# Patient Record
Sex: MALE | Race: WHITE | Employment: FULL TIME | URBAN - METROPOLITAN AREA
[De-identification: names, ages, dates, MRNs, and addresses within clinical notes are randomized per-mention and may not be internally consistent; named-entity substitution may affect disease eponyms.]

---

## 2017-06-11 ENCOUNTER — HOSPITAL ENCOUNTER (EMERGENCY)
Facility: CLINIC | Age: 52
Discharge: HOME | End: 2017-06-11
Attending: NURSE PRACTITIONER | Admitting: NURSE PRACTITIONER
Payer: OTHER MISCELLANEOUS

## 2017-06-11 ENCOUNTER — APPOINTMENT (OUTPATIENT)
Dept: ULTRASOUND IMAGING | Facility: CLINIC | Age: 52
End: 2017-06-11
Attending: NURSE PRACTITIONER
Payer: OTHER MISCELLANEOUS

## 2017-06-11 VITALS
DIASTOLIC BLOOD PRESSURE: 81 MMHG | WEIGHT: 200 LBS | TEMPERATURE: 98.3 F | RESPIRATION RATE: 18 BRPM | SYSTOLIC BLOOD PRESSURE: 137 MMHG | OXYGEN SATURATION: 98 % | HEART RATE: 62 BPM

## 2017-06-11 DIAGNOSIS — M79.621 PAIN OF RIGHT UPPER ARM: ICD-10-CM

## 2017-06-11 DIAGNOSIS — M79.89 SWELLING OF RIGHT UPPER EXTREMITY: ICD-10-CM

## 2017-06-11 DIAGNOSIS — S49.91XA INJURY OF RIGHT UPPER ARM, INITIAL ENCOUNTER: ICD-10-CM

## 2017-06-11 PROCEDURE — 76882 US LMTD JT/FCL EVL NVASC XTR: CPT | Mod: RT

## 2017-06-11 PROCEDURE — 99284 EMERGENCY DEPT VISIT MOD MDM: CPT | Mod: 25

## 2017-06-11 ASSESSMENT — ENCOUNTER SYMPTOMS: SHORTNESS OF BREATH: 0

## 2017-06-11 NOTE — ED AVS SNAPSHOT
[facility] Emergency Department    [address] [place] [address]    Phone:  [phone]    Fax:  [phone]                                       Jacques Segura   MRN: [id number]    Department:  [facility] Emergency Department   Date of Visit:  6/11/2017           Patient Information     Date Of Birth          1965        Your diagnoses for this visit were:     Injury of right upper arm, initial encounter     Pain of right upper arm     Swelling of right upper extremity        You were seen by Zoraida Rodriguez, SMITA CNP.      Follow-up Information     Please follow up.    Why:  f/u with [facility]        Discharge Instructions         Follow up with [phone]    Ace Wrap  Minor muscle or joint injuries are often treated with an elastic bandage. The bandage provides support and compression to the injured area. An elastic bandage is a stretchy, rolled bandage. Elastic bandages range in width from 2 to 6 inches. They can be used for a variety of injuries. The bandages are often called  ACE  bandages, after the most common brand name.  If used correctly, elastic bandages help control swelling and ease pain. An elastic bandage is also a good reminder not to overuse the injured area. However, elastic bandages do not provide a lot of support and will not prevent reinjury.  Home care  To apply an elastic bandage:    Check the skin before wrapping the injury. It should be clean, dry, and free of drainage.    Start wrapping below the injury and work your way toward the body. For an ankle sprain, start wrapping around the foot and work up toward the calf. This will help control swelling.    Overlap the edges of the bandage so it stays snuggly in place.    Wrap the bandage firmly, but not too tightly. A tight bandage can increase swelling on either end of the bandage. Make sure the bandage is wrinkle free.    Leave fingers and toes exposed.    Secure ends of the bandage (even  self-sticking ones) with clips or tape.    Check frequently to ensure adequate circulation, especially in the fingers and toes. Loosen the bandage if there is local swelling, numbness, tingling, discomfort, coldness, or discoloration (skin pale or bluish in color).    Rewrap the bandage as needed during the day. Reroll the bandage as you unwind it.  Continue using the elastic bandage until the pain and swelling are gone or as your healthcare provider advises.  If you have been told to ice the area, the ice can be secured in place with the elastic bandage. Wrap the ice pack with a thin towel to protect the skin. Do not put ice or an ice pack directly on the skin.  Ice the area for no more than 20 minutes at a time.    Follow-up care  Follow up with your healthcare provider, as advised.  When to seek medical advice  Call your healthcare provider for any of the following:    Pain and swelling that doesn't get better or gets worse    Trouble moving injured area    Skin discoloration, numbness, or tingling that doesn t go away after bandage is removed    3543-0774 [employer]. [address] [place] [address]. All rights reserved. This information is not intended as a substitute for professional medical care. Always follow your healthcare professional's instructions.          Discharge References/Attachments     R.I.C.E. (ENGLISH)      24 Hour Appointment Hotline       To make an appointment at any [facility], call [phone] ([phone]). If you don't have a family doctor or clinic, we will help you find one. [facility] are conveniently located to serve the needs of you and your family.             Review of your medicines      Notice     You have not been prescribed any medications.            Procedures and tests performed during your visit     US Extremity Non Vascular Right      Orders Needing Specimen Collection     None      Pending Results     No orders found from  6/9/2017 to 6/12/2017.            Pending Culture Results     No orders found from 6/9/2017 to 6/12/2017.            Pending Results Instructions     If you had any lab results that were not finalized at the time of your Discharge, you can call the ED Lab Result RN at [phone]. You will be contacted by this team for any positive Lab results or changes in treatment. The nurses are available 7 days a week from 10A to 6:30P.  You can leave a message 24 hours per day and they will return your call.        Test Results From Your Hospital Stay              6/11/2017  8:44 PM      Narrative     US EXTREMITY NON VASCULAR RIGHT, 6/11/2017 8:34 PM    Comparison: None    History: R/O RT DISTAL BICEP TENDON RUPTURE        Impression     Impression: No biceps tendon rupture or fluid collection identified on  these images.    GUNNAR FINE                Clinical Quality Measure: Blood Pressure Screening     Your blood pressure was checked while you were in the emergency department today. The last reading we obtained was  BP: (!) 141/95 . Please read the guidelines below about what these numbers mean and what you should do about them.  If your systolic blood pressure (the top number) is less than 120 and your diastolic blood pressure (the bottom number) is less than 80, then your blood pressure is normal. There is nothing more that you need to do about it.  If your systolic blood pressure (the top number) is 120-139 or your diastolic blood pressure (the bottom number) is 80-89, your blood pressure may be higher than it should be. You should have your blood pressure rechecked within a year by a primary care provider.  If your systolic blood pressure (the top number) is 140 or greater or your diastolic blood pressure (the bottom number) is 90 or greater, you may have high blood pressure. High blood pressure is treatable, but if left untreated over time it can put you at risk for heart attack, stroke, or kidney failure. You  "should have your blood pressure rechecked by a primary care provider within the next 4 weeks.  If your provider in the emergency department today gave you specific instructions to follow-up with your doctor or provider even sooner than that, you should follow that instruction and not wait for up to 4 weeks for your follow-up visit.        Thank you for choosing [facility]       Thank you for choosing [facility] for your care. Our goal is always to provide you with excellent care. Hearing back from our patients is one way we can continue to improve our services. Please take a few minutes to complete the written survey that you may receive in the mail after you visit with us. Thank you!        [email or web address] Information     [email or web address] lets you send messages to your doctor, view your test results, renew your prescriptions, schedule appointments and more. To sign up, go to [email or web address] . Click on \"Log in\" on the left side of the screen, which will take you to the Welcome page. Then click on \"Sign up Now\" on the right side of the page.     You will be asked to enter the access code listed below, as well as some personal information. Please follow the directions to create your username and password.     Your access code is: [email or web address]  Expires: 2017  8:50 PM     Your access code will  in 90 days. If you need help or a new code, please call your [facility] clinic or [phone].        Care EveryWhere ID     This is your Care EveryWhere ID. This could be used by other organizations to access your [facility] medical records  [id number]        After Visit Summary       This is your record. Keep this with you and show to your community pharmacist(s) and doctor(s) at your next visit.                  "

## 2017-06-11 NOTE — ED AVS SNAPSHOT
[facility] Emergency Department    [address]    [facility] [address]    Phone:  [phone]    Fax:  [phone]                                       Jacques Segura   MRN: [id number]    Department:  [facility] Emergency Department   Date of Visit:  6/11/2017           After Visit Summary Signature Page     I have received my discharge instructions, and my questions have been answered. I have discussed any challenges I see with this plan with the nurse or doctor.    ..........................................................................................................................................  Patient/Patient Representative Signature      ..........................................................................................................................................  Patient Representative Print Name and Relationship to Patient    ..................................................               ................................................  Date                                            Time    ..........................................................................................................................................  Reviewed by Signature/Title    ...................................................              ..............................................  Date                                                            Time

## 2017-06-12 NOTE — DISCHARGE INSTRUCTIONS
Follow up with [facility] [phone]    Ace Wrap  Minor muscle or joint injuries are often treated with an elastic bandage. The bandage provides support and compression to the injured area. An elastic bandage is a stretchy, rolled bandage. Elastic bandages range in width from 2 to 6 inches. They can be used for a variety of injuries. The bandages are often called  ACE  bandages, after the most common brand name.  If used correctly, elastic bandages help control swelling and ease pain. An elastic bandage is also a good reminder not to overuse the injured area. However, elastic bandages do not provide a lot of support and will not prevent reinjury.  Home care  To apply an elastic bandage:    Check the skin before wrapping the injury. It should be clean, dry, and free of drainage.    Start wrapping below the injury and work your way toward the body. For an ankle sprain, start wrapping around the foot and work up toward the calf. This will help control swelling.    Overlap the edges of the bandage so it stays snuggly in place.    Wrap the bandage firmly, but not too tightly. A tight bandage can increase swelling on either end of the bandage. Make sure the bandage is wrinkle free.    Leave fingers and toes exposed.    Secure ends of the bandage (even self-sticking ones) with clips or tape.    Check frequently to ensure adequate circulation, especially in the fingers and toes. Loosen the bandage if there is local swelling, numbness, tingling, discomfort, coldness, or discoloration (skin pale or bluish in color).    Rewrap the bandage as needed during the day. Reroll the bandage as you unwind it.  Continue using the elastic bandage until the pain and swelling are gone or as your healthcare provider advises.  If you have been told to ice the area, the ice can be secured in place with the elastic bandage. Wrap the ice pack with a thin towel to protect the skin. Do not put ice or an ice pack directly on the skin.  Ice the area  for no more than 20 minutes at a time.    Follow-up care  Follow up with your healthcare provider, as advised.  When to seek medical advice  Call your healthcare provider for any of the following:    Pain and swelling that doesn't get better or gets worse    Trouble moving injured area    Skin discoloration, numbness, or tingling that doesn t go away after bandage is removed    7061-9405 [employer]. [address] [place] [address], [place] [id number]. All rights reserved. This information is not intended as a substitute for professional medical care. Always follow your healthcare professional's instructions.

## 2017-06-12 NOTE — ED PROVIDER NOTES
"  History     Chief Complaint:  Arm pain    HPI   Jacques Segura is a 51 year old male who presents with arm pain. Prior to arrival, the patient was lifting up on a valve when he heard a \"pop\" in his right arm and experienced a shooting pain that radiated down his right arm. The patient states he is still experiencing pain in his right arm and his right upper arm is deformed compared to his left arm. No chest pain or shortness of breath.    Allergies:  No known drug allergies.     Medications:    The patient is currently on no regular medications.     Past Medical History:    History reviewed.  No significant past medical history.     Past Surgical History:    History reviewed. No pertinent past surgical history.    Family History:    History reviewed. No pertinent family history.    Social History:  Marital Status:   Presents to the ED alone  Tobacco Use: negative  Alcohol Use: negative     Review of Systems   Respiratory: Negative for shortness of breath.    Cardiovascular: Negative for chest pain.   Musculoskeletal:        Positive for right arm pain   All other systems reviewed and are negative.    Physical Exam   First Vitals:  BP: (!) 141/95  Pulse: 67  Temp: 98.3  F (36.8  C)  Resp: 18  Weight: 90.7 kg (200 lb)  SpO2: 97 %      Physical Exam  Eyes: Pupils equally round  HENT: Head is normal in appearance. Oropharynx is normal with moist mucus membranes.  Cardiovascular: Normal color of mucus membranes  Respiratory: Normal respiratory effort  Musculoskeletal: Right upper arm mild soft tissue swelling distal bicep with a bulge, AC pulse intact, normal ROM to elbow, shoulder and wrist.   Skin: Normal, without rash.  Lymphatic: No edema  Neurologic: Cranial nerves grossly intact, normal cognition, no apparent deficits.  Psychiatric: Normal affect.      Emergency Department Course   Imaging:  Radiographic findings were communicated with the patient who voiced understanding of the findings.    Arm, right, " venous US  No biceps tendon rupture or fluid collection identified on  these images.  Report per radiology.    Emergency Department Course:  Nursing notes and vitals reviewed.  I performed an exam of the patient as documented above.   The patient was sent for a right arm ultrasound while in the emergency department, findings above.   Findings and plan explained to the patient. Patient discharged home with instructions regarding supportive care, medications, and reasons to return. The importance of close follow-up was reviewed.    Impression & Plan    Medical Decision Making:  Jacques Segura is a 51 year old male presents for evaluation of a right arm injury.  Signs and symptoms are consistent with a strain of the bicep muscle.  A broad differential was considered including sprain, strain, fracture, tendon rupture, nerve impingement/compromise, referred pain. Supportive outpatient management is indicated.  Rest, ice, and elevation treatment was discussed with the patient. The patient was ACE-wrapped and will follow up with [facility]. The patients head to toe trauma exam is otherwise negative for serious underlying disease of the head, neck, chest, abdomen, extremities, pelvis.  Close follow-up with patient's primary care physician and [facility] per discharge precautions. Contusion discharge instructions given for home.     Diagnosis:    ICD-10-CM    1. Injury of right upper arm, initial encounter S49.91XA    2. Pain of right upper arm M79.621    3. Swelling of right upper extremity M79.89        Disposition:  Discharge to home.     I, Yony Arreguin, am serving as a scribe on 6/11/2017 at 7:26 PM to personally document services performed by SMITA Mathews, CNP based on my observations and the provider's statements to me.        Zoraida Rodriguez APRN CNP  06/11/17 2102

## 2017-06-12 NOTE — ED NOTES
51-year-old male presents to the ER with inner right arm pain. Pt was pushing up on something and felt a pop in his inner arm. Worried it was dislocated but patient has full function and movement of arm, hand, wrist, elbow and all fingers. No deformity noted. CMS check good.

## 2018-09-29 ENCOUNTER — APPOINTMENT (OUTPATIENT)
Dept: GENERAL RADIOLOGY | Facility: CLINIC | Age: 53
End: 2018-09-29
Attending: NURSE PRACTITIONER
Payer: COMMERCIAL

## 2018-09-29 ENCOUNTER — HOSPITAL ENCOUNTER (EMERGENCY)
Facility: CLINIC | Age: 53
Discharge: HOME | End: 2018-09-29
Attending: NURSE PRACTITIONER | Admitting: NURSE PRACTITIONER
Payer: COMMERCIAL

## 2018-09-29 VITALS
TEMPERATURE: 98.1 F | SYSTOLIC BLOOD PRESSURE: 149 MMHG | DIASTOLIC BLOOD PRESSURE: 123 MMHG | BODY MASS INDEX: 27.09 KG/M2 | OXYGEN SATURATION: 99 % | WEIGHT: 200 LBS | HEIGHT: 72 IN | RESPIRATION RATE: 16 BRPM

## 2018-09-29 DIAGNOSIS — S61.319A: ICD-10-CM

## 2018-09-29 PROCEDURE — 99283 EMERGENCY DEPT VISIT LOW MDM: CPT | Mod: 25

## 2018-09-29 PROCEDURE — 25000128 H RX IP 250 OP 636: Performed by: NURSE PRACTITIONER

## 2018-09-29 PROCEDURE — 25000132 ZZH RX MED GY IP 250 OP 250 PS 637: Performed by: NURSE PRACTITIONER

## 2018-09-29 PROCEDURE — 12002 RPR S/N/AX/GEN/TRNK2.6-7.5CM: CPT

## 2018-09-29 PROCEDURE — 12041 INTMD RPR N-HF/GENIT 2.5CM/<: CPT

## 2018-09-29 PROCEDURE — 90471 IMMUNIZATION ADMIN: CPT

## 2018-09-29 PROCEDURE — 73140 X-RAY EXAM OF FINGER(S): CPT | Mod: 50

## 2018-09-29 PROCEDURE — 90715 TDAP VACCINE 7 YRS/> IM: CPT | Performed by: NURSE PRACTITIONER

## 2018-09-29 RX ORDER — BUPIVACAINE HYDROCHLORIDE 5 MG/ML
INJECTION, SOLUTION PERINEURAL
Status: DISCONTINUED
Start: 2018-09-29 | End: 2018-09-29 | Stop reason: HOSPADM

## 2018-09-29 RX ORDER — CEPHALEXIN 500 MG/1
500 CAPSULE ORAL ONCE
Status: COMPLETED | OUTPATIENT
Start: 2018-09-29 | End: 2018-09-29

## 2018-09-29 RX ORDER — CEPHALEXIN 500 MG/1
500 CAPSULE ORAL 4 TIMES DAILY
Qty: 28 CAPSULE | Refills: 0 | Status: SHIPPED | OUTPATIENT
Start: 2018-09-29 | End: 2018-10-06

## 2018-09-29 RX ADMIN — CEPHALEXIN 500 MG: 500 CAPSULE ORAL at 19:17

## 2018-09-29 RX ADMIN — CLOSTRIDIUM TETANI TOXOID ANTIGEN (FORMALDEHYDE INACTIVATED), CORYNEBACTERIUM DIPHTHERIAE TOXOID ANTIGEN (FORMALDEHYDE INACTIVATED), BORDETELLA PERTUSSIS TOXOID ANTIGEN (GLUTARALDEHYDE INACTIVATED), BORDETELLA PERTUSSIS FILAMENTOUS HEMAGGLUTININ ANTIGEN (FORMALDEHYDE INACTIVATED), BORDETELLA PERTUSSIS PERTACTIN ANTIGEN, AND BORDETELLA PERTUSSIS FIMBRIAE 2/3 ANTIGEN 0.5 ML: 5; 2; 2.5; 5; 3; 5 INJECTION, SUSPENSION INTRAMUSCULAR at 17:57

## 2018-09-29 ASSESSMENT — ENCOUNTER SYMPTOMS
WEAKNESS: 0
WOUND: 1

## 2018-09-29 NOTE — ED PROVIDER NOTES
History     Chief Complaint:  Laceration    HPI   Jacques Segura is a 52 year old male who presents to the ED for evaluation of a laceration. He reports that he was using a cut-off saw when the wooden board slipped, and his hands got caught in the saw, so he came to the ED for a repair. In the ED, he reports the majority of the wound is on his right middle finger and left index finger. Per chart review, his most recent tetanus shot was in 2000.     Allergies:  NKDA     Medications:    The patient does not report any no regular medications.    Past Medical History:    The patient does not report any significant past medical history.    Past Surgical History:    The patient does not report any pertinent past surgical history.    Family History:    No past pertinent family history.    Social History:  Marital Status:   [2]  Presents with wife.   Negative for tobacco use.  Negative for alcohol use.     Review of Systems   Skin: Positive for wound (right and left hands).   Neurological: Negative for weakness.   Psychiatric/Behavioral: Negative for suicidal ideas.   All other systems reviewed and are negative.      Physical Exam     Patient Vitals for the past 24 hrs:   BP Temp Temp src Heart Rate Resp SpO2 Height Weight   09/29/18 1740 (!) 151/99 98.1  F (36.7  C) Oral 76 16 99 % 1.829 m (6') 90.7 kg (200 lb)     Physical Exam  General: Alert, No obvious discomfort, well kept  Eyes: PERRL, conjunctivae pink no scleral icterus or conjunctival injection  ENT:   Moist mucus membranes, posterior oropharynx clear without erythema or exudates, No lymphadenopathy, Normal voice  Resp:  Lungs clear to auscultation bilaterally, no crackles/rubs/wheezes. Good air movement  CV:  Normal rate and rhythm, no murmurs/rubs/gallops  GI:  Abdomen soft and non-distended.  Normoactive BS.  No tenderness, guarding or rebound, No masses  Skin:  Warm, dry.  No rashes or petechiae. Right middle finger has a 4 cm longitudinal laceration  that does go to bone to tendon and the tendon strength is intact. Left index finger has a 1.5 cm flap type laceration through the distal tip that does involve nail plate and bed, but does not involve the nail matrix. The pad of the left middle finger has a 1.0 cm avulsion type flap laceration.   Musculoskeletal: No peripheral edema or calf tenderness, Normal gross ROM   Neuro: Alert and oriented to person/place/time, normal sensation  Psychiatric: Normal affect, cooperative, good eye contact    Emergency Department Course   Imaging:  Radiographic findings were communicated with the patient who voiced understanding of the findings.    XR Finger Bilateral G/E 2 views:   No evidence of acute fracture or radiopaque foreign body, As per radiology.     Procedures:    Narrative: Procedure: Laceration Repair        LACERATION:  A complex minimally Contaminated 4 cm laceration.      LOCATION: Right middle finger      FUNCTION:  Distally sensation, circulation and tendon function are intact.      ANESTHESIA:  Local using Bupivicaine      PREPARATION:  Irrigation and Scrubbing with Normal Saline and Shur Clens      DEBRIDEMENT:  minimal debridement      CLOSURE:  Wound was closed with One Layer.  Skin closed with 11 x 4.0 Nylon   Sutures using interrupted sutures.      Narrative: Procedure: Laceration Repair        LACERATION:  A complex minimally Contaminated 1.5 cm laceration.      LOCATION:  Left index finger      FUNCTION:  Distally sensation, circulation and tendon function are intact.      ANESTHESIA:  Local using Bupivicaine       PREPARATION:  Irrigation and Scrubbing with Normal Saline and Shur Clens      DEBRIDEMENT:  minimal debridement      CLOSURE:  Wound was closed with Two Layers: Subcutaneous layer closed with 3 x   3.0 Vicryl Sutures. Skin closed with 7 x 4.0  Nylon Sutures using interrupted sutures      Narrative: Procedure: Laceration Repair        LACERATION:  A complex minimally Contaminated 1.0 cm  laceration.      LOCATION:  Left Middle finger      FUNCTION:  Distally sensation and circulation are intact.      ANESTHESIA:  Local using Bupivicaine      PREPARATION:  Irrigation and Scrubbing with Normal Saline and Shur Clens      DEBRIDEMENT:  minimal debridement      CLOSURE:  Wound was closed with One Layer.  Skin closed with 3 x 4.0 Nylon using   interrupted sutures.    Interventions:  1757 TDAP, 0.5 mL, IM  1917 Keflex, 500 mg, oral    Emergency Department Course:  Nursing notes and vitals reviewed. (1742) I checked the patient and performed a preliminary exam, as documented above.      Medicine administered as documented above.    The patient was sent for a bilateral finger x-ray while in the emergency department, findings above.     (1830) I rechecked the patient and performed a more detailed examination. I performed the laceration repairs at this time. After the repairs, the patient was sent to imaging for x-rays.      Findings and plan explained to the Patient. Patient discharged home with instructions regarding supportive care, medications, and reasons to return. The importance of close follow-up was reviewed. The patient was prescribed Cephalexin.     I personally answered all related questions prior to discharge.       Impression & Plan    Medical Decision Making:  Findings and exam were consistent with laceration of Right 2nd digit, Left 2nd digit, 3rd digit, which was repaired as noted above.  There does appear to be a tendon injury. Splint placed. There is no evidence at this time of associated fracture or foreign body, deep space infection,  or neurovascular injury. Call ASAP to schedule follow up with orthopedics. The sutures will likely be removed after follow up with ortho, ortherwise suture removal in 10-14 days. Indications for immediate return to ER/UR were reviewed and included but are not limited to, redness, fevers, drainage, increasing pain, high fevers, or other concerns. Prescriptions  for Keflex were provided.    Diagnosis:    ICD-10-CM    1. Laceration of finger without foreign body with damage to nail, unspecified finger, unspecified laterality, initial encounter S61.319A    2. Tendon laceration T14.8XXA        Disposition:  discharged to home    Discharge Medications:  New Prescriptions    CEPHALEXIN (KEFLEX) 500 MG CAPSULE    Take 1 capsule (500 mg) by mouth 4 times daily for 7 days     Scribe Disclosure:  I, Dee Reed, am serving as a scribe on 9/29/2018 at 5:42 PM to personally document services performed by Gt Se[place] APRN* based on my observations and the provider's statements to me.     Dee Reed  9/29/2018   [facility] EMERGENCY DEPARTMENT       Gt Sevilla APRN CNP  09/29/18 1038

## 2018-09-29 NOTE — ED AVS SNAPSHOT
[facility] Emergency Department    [address]    [facility] [address]    Phone:  [phone]    Fax:  [phone]                                       Jacques Segura   MRN: [id number]    Department:  [facility] Emergency Department   Date of Visit:  9/29/2018           After Visit Summary Signature Page     I have received my discharge instructions, and my questions have been answered. I have discussed any challenges I see with this plan with the nurse or doctor.    ..........................................................................................................................................  Patient/Patient Representative Signature      ..........................................................................................................................................  Patient Representative Print Name and Relationship to Patient    ..................................................               ................................................  Date                                   Time    ..........................................................................................................................................  Reviewed by Signature/Title    ...................................................              ..............................................  Date                                               Time          22EPIC Rev 08/18

## 2018-09-29 NOTE — LETTER
September 29, 2018      To Whom It May Concern:      Jacques Segura was seen in our Emergency Department today, 09/29/18.  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  No strenuous activity or lifting until cleared by orthopedics    Sincerely,        Gt Sevilla, APRN CNP

## 2018-09-29 NOTE — ED AVS SNAPSHOT
[facility] Emergency Department    [address]    [facility] [address]    Phone:  [phone]    Fax:  [phone]                                       Jacques Segura   MRN: [id number]    Department:  [facility] Emergency Department   Date of Visit:  9/29/2018           Patient Information     Date Of Birth          1965        Your diagnoses for this visit were:     Laceration of finger without foreign body with damage to nail, unspecified finger, unspecified laterality, initial encounter     Tendon laceration        You were seen by Gt Sevilla, SMITA CNP.      Follow-up Information     [facility]    Why:  on Monday to schedule appointment for follow up    Contact information:    [address]  [address]  [facility] [id number] [phone]        Discharge Instructions       Discharge Instructions  Laceration (Cut)    You were seen today for a laceration (cut).  Your provider examined your laceration for any problems such a buried foreign body (like glass, a splinter, or gravel), or injury to blood vessels, tendons, and nerves.  Your provider may have also rinsed and/or scrubbed your laceration to help prevent an infection. It may not be possible to find all problems with your laceration on the first visit; occasionally foreign bodies or a tendon injury can go undetected.    Your laceration may have been closed in one of several ways:    No closure: many wounds will heal just fine without closure.    Stitches: regular stitches that require removal.    Staples: skin staples are often used in the scalp/head.    Wound adhesive (glue): skin glue can be used for certain lacerations and doesn t require removal.    Wound strips (aka Butterfly bandages or steri-strips): these are bandages that help to close a wound.    Absorbable stitches:  dissolving  stitches that go away on their own and usually don t require  removal.    A small percentage of wounds will develop an infection regardless of how well the wound is cared for. Antibiotics are generally not indicated to prevent an infection so are only given for a small number of high-risk wounds. Some lacerations are too high risk to close, and are left open to heal because closure can increase the likelihood that an infection will develop.    Remember that all lacerations, no matter how expertly repaired, will cause scarring. We consider many factors, techniques, and materials, in our efforts to provide the best possible cosmetic outcome.    Generally, every Emergency Department visit should have a follow-up clinic visit with either a primary or a specialty clinic/provider. Please follow-up as instructed by your emergency provider today.     Return to the Emergency Department right away if:    You have more redness, swelling, pain, drainage (pus), a bad smell, or red streaking from your laceration as these symptoms could indicate an infection.    You have a fever of 100.4 F or more.    You have bleeding that you cannot stop at home. If your cut starts to bleed, hold pressure on the bleeding area with a clean cloth or put pressure over the bandage.  If the bleeding does not stop after using constant pressure for 30 minutes, you should return to the Emergency Department for further treatment.    An area past the laceration is cool, pale, or blue compared with the other side, or has a slower return of color when squeezed.    Your dressing seems too tight or starts to get uncomfortable or painful. For children, signs of a problem might be irritability or restlessness.    You have loss of normal function or use of an area, such as being unable to straighten or bend a finger normally.    You have a numb area past the laceration.    Return to the Emergency Department or see your regular provider if:    The laceration starts to come open.     You have something coming out of the cut  or a feeling that there is something in the laceration.    Your wound will not heal, or keeps breaking open. There can always be glass, wood, dirt or other things in any wound.  They will not always show up, even on x-rays.  If a wound does not heal, this may be why, and it is important to follow-up with your regular provider.    Home Care:    Take your dressing off in 12-24 hours, or as instructed by your provider, to check your laceration. Remove the dressing sooner if it seems too tight or painful, or if it is getting numb, tingly, or pale past the dressing.    Gently wash your laceration 1-2 times daily with clean water and mild soap. It is okay to shower or run clean water over the laceration, but do not let the laceration soak in water (no swimming).    If your laceration was closed with wound adhesive or strips: pat it dry and leave it open to the air. For all other repairs: after you wash your laceration, or at least 2 times a day, apply antibiotic ointment (such as Neosporin  or Bacitracin ) to the laceration, then cover it with a Band-Aid  or gauze.    Keep the laceration clean. Wear gloves or other protective clothing if you are around dirt.    Follow-up for removal:    If your wound was closed with staples or regular stitches, they need to be removed according to the instructions and timeline specified by your provider today.    If your wound was closed with absorbable ( dissolving ) sutures, they should fall out, dissolve, or not be visible in about one week. If they are still visible, then they should be removed according to the instructions and timeline specified by your provider today.    Scars:  To help minimize scarring:    Wear sunscreen over the healed laceration when out in the sun.    Massage the area regularly once healed.    You may apply Vitamin E to the healed wound.    Wait. Scars improve in appearance over months and years.    If you were given a prescription for medicine here today, be  sure to read all of the information (including the package insert) that comes with your prescription.  This will include important information about the medicine, its side effects, and any warnings that you need to know about.  The pharmacist who fills the prescription can provide more information and answer questions you may have about the medicine.  If you have questions or concerns that the pharmacist cannot address, please call or return to the Emergency Department.       Remember that you can always come back to the Emergency Department if you are not able to see your regular provider in the amount of time listed above, if you get any new symptoms, or if there is anything that worries you.    Discharge References/Attachments     TENDON LACERATION (ENGLISH)      24 Hour Appointment Hotline       To make an appointment at any [facility], call [phone] ([phone]). If you don't have a family doctor or clinic, we will help you find one. [facility] are conveniently located to serve the needs of you and your family.             Review of your medicines      START taking        Dose / Directions Last dose taken    cephALEXin 500 MG capsule   Commonly known as:  KEFLEX   Dose:  500 mg   Quantity:  28 capsule        Take 1 capsule (500 mg) by mouth 4 times daily for 7 days   Refills:  0                Prescriptions were sent or printed at these locations (1 Prescription)                   Other Prescriptions                Printed at Department/Unit printer (1 of 1)         cephALEXin (KEFLEX) 500 MG capsule                Procedures and tests performed during your visit     XR Finger Bilateral G/E 2 Views      Orders Needing Specimen Collection     None      Pending Results     No orders found from 9/27/2018 to 9/30/2018.            Pending Culture Results     No orders found from 9/27/2018 to 9/30/2018.            Pending Results Instructions     If you had any lab results that were not finalized  at the time of your Discharge, you can call the ED Lab Result RN at [phone]. You will be contacted by this team for any positive Lab results or changes in treatment. The nurses are available 7 days a week from 10A to 6:30P.  You can leave a message 24 hours per day and they will return your call.        Test Results From Your Hospital Stay              9/29/2018  8:33 PM      Narrative     FINGER BILATERAL TWO OR MORE VIEWS   9/29/2018 8:22 PM     HISTORY: Laceration, evaluate for bony injury.    COMPARISON: None.    FINDINGS:   Left hand: There is a laceration/partial amputation of the ulnar  aspect of the tip of the second (index) finger. No underlying acute  fracture, dislocation, or radiopaque foreign body is identified. There  is slight irregularity of the radial aspect of the long finger at the  tip.    Right hand: No acute fracture, dislocation, or radiopaque foreign body  is identified.        Impression     IMPRESSION: No evidence of acute fracture or radiopaque foreign body.     BHANU KELLY MD                Clinical Quality Measure: Blood Pressure Screening     Your blood pressure was checked while you were in the emergency department today. The last reading we obtained was  BP: (!) 151/99 . Please read the guidelines below about what these numbers mean and what you should do about them.  If your systolic blood pressure (the top number) is less than 120 and your diastolic blood pressure (the bottom number) is less than 80, then your blood pressure is normal. There is nothing more that you need to do about it.  If your systolic blood pressure (the top number) is 120-139 or your diastolic blood pressure (the bottom number) is 80-89, your blood pressure may be higher than it should be. You should have your blood pressure rechecked within a year by a primary care provider.  If your systolic blood pressure (the top number) is 140 or greater or your diastolic blood pressure (the bottom number) is 90 or  "greater, you may have high blood pressure. High blood pressure is treatable, but if left untreated over time it can put you at risk for heart attack, stroke, or kidney failure. You should have your blood pressure rechecked by a primary care provider within the next 4 weeks.  If your provider in the emergency department today gave you specific instructions to follow-up with your doctor or provider even sooner than that, you should follow that instruction and not wait for up to 4 weeks for your follow-up visit.        Thank you for choosing [facility]       Thank you for choosing [facility] for your care. Our goal is always to provide you with excellent care. Hearing back from our patients is one way we can continue to improve our services. Please take a few minutes to complete the written survey that you may receive in the mail after you visit with us. Thank you!        [facility] Information     [employer] lets you send messages to your doctor, view your test results, renew your prescriptions, schedule appointments and more. To sign up, go to [email or web address] . Click on \"Log in\" on the left side of the screen, which will take you to the Welcome page. Then click on \"Sign up Now\" on the right side of the page.     You will be asked to enter the access code listed below, as well as some personal information. Please follow the directions to create your username and password.     Your access code is: [email or web address]  Expires: 2018  8:58 PM     Your access code will  in 90 days. If you need help or a new code, please call your [facility] clinic or [phone].        Care EveryWhere ID     This is your Care EveryWhere ID. This could be used by other organizations to access your [facility] medical records  [id number]        Equal Access to Services     ALLI TERRY : Ricarda Muñiz, carol ann smith, miranda santos. So [facility] [phone].    ATENCIÓN: " Si habla jimbo, tiene a mays disposición servicios gratuitos de asistencia lingüística. Llame al [phone].    We comply with applicable federal civil rights laws and [place] laws. We do not discriminate on the basis of race, color, national origin, age, disability, sex, sexual orientation, or gender identity.            After Visit Summary       This is your record. Keep this with you and show to your community pharmacist(s) and doctor(s) at your next visit.

## 2018-09-29 NOTE — ED TRIAGE NOTES
Patient was using a cut-off saw at home when the wooden board he was cutting slipped. Patient's middle finger of right hand has a uneven lac down the top-center, the length of the finger. Bleeding controlled. Patient's left index finger has a laceration through the fingernail, approximately 2cm down the top of the nail/finger. Bleeding controlled. A third laceration is noted on the left middle finger, approximately 1cm, to the tip of the finger. Bleeding controlled. CMS intact.